# Patient Record
Sex: FEMALE | Race: ASIAN | Employment: UNEMPLOYED | URBAN - METROPOLITAN AREA
[De-identification: names, ages, dates, MRNs, and addresses within clinical notes are randomized per-mention and may not be internally consistent; named-entity substitution may affect disease eponyms.]

---

## 2017-10-17 ENCOUNTER — OFFICE VISIT (OUTPATIENT)
Dept: FAMILY MEDICINE CLINIC | Facility: CLINIC | Age: 32
End: 2017-10-17

## 2017-10-17 VITALS
SYSTOLIC BLOOD PRESSURE: 122 MMHG | DIASTOLIC BLOOD PRESSURE: 62 MMHG | BODY MASS INDEX: 23.97 KG/M2 | HEIGHT: 63.5 IN | HEART RATE: 78 BPM | TEMPERATURE: 98 F | WEIGHT: 137 LBS | OXYGEN SATURATION: 99 % | RESPIRATION RATE: 16 BRPM

## 2017-10-17 DIAGNOSIS — N90.89 SWELLING OF VULVA: ICD-10-CM

## 2017-10-17 DIAGNOSIS — L65.9 HAIR LOSS: ICD-10-CM

## 2017-10-17 DIAGNOSIS — E28.2 PCOS (POLYCYSTIC OVARIAN SYNDROME): ICD-10-CM

## 2017-10-17 DIAGNOSIS — R73.03 PREDIABETES: ICD-10-CM

## 2017-10-17 DIAGNOSIS — Z78.9 VEGETARIAN DIET: ICD-10-CM

## 2017-10-17 DIAGNOSIS — Z00.00 ROUTINE GENERAL MEDICAL EXAMINATION AT A HEALTH CARE FACILITY: Primary | ICD-10-CM

## 2017-10-17 PROCEDURE — 99385 PREV VISIT NEW AGE 18-39: CPT | Performed by: FAMILY MEDICINE

## 2017-10-17 NOTE — PROGRESS NOTES
Yao Carreno is a 33year old female. Patient presents with:  Establish Care: New patient  Imm/Inj: Declined flu vaccine. HPI:   Patient is seen for initial visit.   Seen today for annual physical    Irregular periods, tried OCP in the past but torres symptoms. No symptoms of diabetes. Respiratory: No cough, shortness of breath, dyspnea on exertion, wheezing. Cardiovascular: No heart palpitations, irregular heartbeat, faintness or syncope, no chest pressure or chest pain on exertion.  No edema or varic Art Sher was seen today for establish care and imm/inj. Diagnoses and all orders for this visit:    Routine general medical examination at a health care facility  -     CBC WITH DIFFERENTIAL WITH PLATELET;  Future  -     ASSAY, THYROID STIM HORMONE; Futu

## 2017-10-17 NOTE — PATIENT INSTRUCTIONS
Will call with lab results when available. Please follow-up when you notice a swelling so we can examine it. Please fax any form that needs to be filled out for your insurance company.   Prevention Guidelines, Women Ages 25 to 44  Screening tests and va Tuberculosis Women at increased risk for infection – talk with your healthcare provider Ask your healthcare provider   Vision All women in this age group At least 1 complete exam in your 25s, and 2 in your 35s   Vaccine2 Who needs it How often   Chickenpox Tetanus/diphtheria/pertussis (Td/Tdap) booster All women in this age group Td every 10 years, or a one-time dose of Tdap instead of a Td booster after age 25, then Td every 10 years   Counseling Who needs it How often   BRCA gene mutation testing for breas

## 2017-10-20 ENCOUNTER — LAB ENCOUNTER (OUTPATIENT)
Dept: LAB | Age: 32
End: 2017-10-20
Attending: FAMILY MEDICINE
Payer: COMMERCIAL

## 2017-10-20 DIAGNOSIS — R73.03 PREDIABETES: ICD-10-CM

## 2017-10-20 DIAGNOSIS — Z00.00 ROUTINE GENERAL MEDICAL EXAMINATION AT A HEALTH CARE FACILITY: ICD-10-CM

## 2017-10-20 PROCEDURE — 80050 GENERAL HEALTH PANEL: CPT | Performed by: FAMILY MEDICINE

## 2017-10-20 PROCEDURE — 82607 VITAMIN B-12: CPT | Performed by: FAMILY MEDICINE

## 2017-10-20 PROCEDURE — 83540 ASSAY OF IRON: CPT | Performed by: FAMILY MEDICINE

## 2017-10-20 PROCEDURE — 83036 HEMOGLOBIN GLYCOSYLATED A1C: CPT | Performed by: FAMILY MEDICINE

## 2017-10-20 PROCEDURE — 83550 IRON BINDING TEST: CPT | Performed by: FAMILY MEDICINE

## 2017-10-20 PROCEDURE — 82728 ASSAY OF FERRITIN: CPT | Performed by: FAMILY MEDICINE

## 2017-10-20 PROCEDURE — 36415 COLL VENOUS BLD VENIPUNCTURE: CPT | Performed by: FAMILY MEDICINE

## 2017-10-20 PROCEDURE — 82306 VITAMIN D 25 HYDROXY: CPT | Performed by: FAMILY MEDICINE

## 2017-10-20 PROCEDURE — 80061 LIPID PANEL: CPT | Performed by: FAMILY MEDICINE

## 2017-11-08 ENCOUNTER — TELEPHONE (OUTPATIENT)
Dept: FAMILY MEDICINE CLINIC | Facility: CLINIC | Age: 32
End: 2017-11-08

## 2017-11-08 ENCOUNTER — OFFICE VISIT (OUTPATIENT)
Dept: FAMILY MEDICINE CLINIC | Facility: CLINIC | Age: 32
End: 2017-11-08

## 2017-11-08 VITALS
TEMPERATURE: 98 F | HEART RATE: 88 BPM | SYSTOLIC BLOOD PRESSURE: 112 MMHG | OXYGEN SATURATION: 98 % | HEIGHT: 64 IN | WEIGHT: 140.5 LBS | RESPIRATION RATE: 20 BRPM | BODY MASS INDEX: 23.99 KG/M2 | DIASTOLIC BLOOD PRESSURE: 64 MMHG

## 2017-11-08 DIAGNOSIS — R19.09 LUMP IN THE GROIN: Primary | ICD-10-CM

## 2017-11-08 DIAGNOSIS — J20.9 ACUTE BRONCHITIS, UNSPECIFIED ORGANISM: ICD-10-CM

## 2017-11-08 PROCEDURE — 99213 OFFICE O/P EST LOW 20 MIN: CPT | Performed by: FAMILY MEDICINE

## 2017-11-08 RX ORDER — ALBUTEROL SULFATE 90 UG/1
2 AEROSOL, METERED RESPIRATORY (INHALATION) EVERY 6 HOURS PRN
Qty: 1 INHALER | Refills: 0 | Status: SHIPPED | OUTPATIENT
Start: 2017-11-08 | End: 2017-11-18

## 2017-11-08 NOTE — TELEPHONE ENCOUNTER
Patient states she spoke with Dr Locke Hopping about a condition at her last visit. She states she was advised to call us when the condition occurs and we will put her in that day. I do not see an opening today.   I offered to schedule her for tomorrow and she

## 2017-11-08 NOTE — TELEPHONE ENCOUNTER
Pt called back - had not received a call back yet. Spoke to Dr Darby and scheduled Appt for today. Coming at 3:15, per Dr Darby.     Future Appointments  Date Time Provider Francisco Lisa   11/8/2017 3:30 PM Sharmaine Weaver MD [facility] [address]

## 2017-11-08 NOTE — PATIENT INSTRUCTIONS
will call with ultrasound results when available, likely a hernia, if positive for hernia will refer to a surgeon. What Is Acute Bronchitis? Acute bronchitis is when the airways in your lungs (bronchial tubes) become red and swollen (inflamed).  It is u · A nasal or throat swab. This tests to see if you have a bacterial infection. · A chest X-ray. This is done if your healthcare provider thinks you have pneumonia. · Tests to check for an underlying condition.  Other tests may be done to check for things · Take all of the medicine. Take the medicine until it is used up, even if symptoms have improved. If you don’t, the bronchitis may come back. · Take the medicines as directed. For instance, some medicines should be taken with food.   · Ask about side effe · Breathe in slowly and deeply as you press down on the inhaler to release the medicine. · Inhale fully. Step 4:  · Hold your breath for a count of 10, or as long as you can comfortably. · Then breathe out slowly through your mouth.   · Repeat these step

## 2017-11-13 NOTE — PROGRESS NOTES
Geeta Kaur is a 33year old female. Patient presents with:  Swelling: lower left side of pelvic area. Cough: For four days.     HPI:   Patient complaining of swelling in the left groin and pubic area, states notices it on and off, has had it for th

## 2018-09-05 PROBLEM — S92.352A CLOSED DISPLACED FRACTURE OF FIFTH METATARSAL BONE OF LEFT FOOT, INITIAL ENCOUNTER: Status: ACTIVE | Noted: 2018-09-05

## 2019-02-16 ENCOUNTER — PATIENT MESSAGE (OUTPATIENT)
Dept: FAMILY MEDICINE CLINIC | Facility: CLINIC | Age: 34
End: 2019-02-16

## 2019-02-18 NOTE — TELEPHONE ENCOUNTER
From: Andrew Rasheed  To: Lenora Ferreira MD  Sent: 2/16/2019 11:04 PM CST  Subject: Referral Request    Hello Doctor   In my last visit,you suspected hernia on my lower right side of my abdomen.   Could you please let me know on what kind of test I thiago

## 2019-07-12 ENCOUNTER — OFFICE VISIT (OUTPATIENT)
Dept: FAMILY MEDICINE CLINIC | Facility: CLINIC | Age: 34
End: 2019-07-12
Payer: COMMERCIAL

## 2019-07-12 VITALS
HEIGHT: 63.5 IN | SYSTOLIC BLOOD PRESSURE: 102 MMHG | BODY MASS INDEX: 25.44 KG/M2 | WEIGHT: 145.38 LBS | RESPIRATION RATE: 18 BRPM | HEART RATE: 87 BPM | OXYGEN SATURATION: 99 % | DIASTOLIC BLOOD PRESSURE: 60 MMHG | TEMPERATURE: 99 F

## 2019-07-12 DIAGNOSIS — E55.9 VITAMIN D DEFICIENCY: ICD-10-CM

## 2019-07-12 DIAGNOSIS — R10.2 PELVIC PAIN: Primary | ICD-10-CM

## 2019-07-12 DIAGNOSIS — E53.8 VITAMIN B12 DEFICIENCY: ICD-10-CM

## 2019-07-12 DIAGNOSIS — M25.50 MULTIPLE JOINT PAIN: ICD-10-CM

## 2019-07-12 DIAGNOSIS — M54.2 NECK PAIN: ICD-10-CM

## 2019-07-12 DIAGNOSIS — R39.15 URINARY URGENCY: ICD-10-CM

## 2019-07-12 DIAGNOSIS — R19.09 LUMP IN THE GROIN: ICD-10-CM

## 2019-07-12 DIAGNOSIS — R42 DIZZINESS: ICD-10-CM

## 2019-07-12 LAB
BILIRUBIN: NEGATIVE
GLUCOSE (URINE DIPSTICK): NEGATIVE MG/DL
KETONES (URINE DIPSTICK): NEGATIVE MG/DL
LEUKOCYTES: NEGATIVE
MULTISTIX LOT#: NORMAL NUMERIC
NITRITE, URINE: NEGATIVE
OCCULT BLOOD: NEGATIVE
PH, URINE: 7 (ref 4.5–8)
PROTEIN (URINE DIPSTICK): NEGATIVE MG/DL
SPECIFIC GRAVITY: 1.02 (ref 1–1.03)
URINE-COLOR: YELLOW
UROBILINOGEN,SEMI-QN: 0.2 MG/DL (ref 0–1.9)

## 2019-07-12 PROCEDURE — 99214 OFFICE O/P EST MOD 30 MIN: CPT | Performed by: FAMILY MEDICINE

## 2019-07-12 PROCEDURE — 81003 URINALYSIS AUTO W/O SCOPE: CPT | Performed by: FAMILY MEDICINE

## 2019-07-12 NOTE — PROGRESS NOTES
Keven Nam is a 36year old female. Patient presents with:  Abdominal Pain: lower abdominal pain for three weeks    HPI:   Complaining of left lower abdominal pain that is radiating to the pubic area, states feels a weird sensation.  States a moth ago tenderness in the lower abdomen. GROIN: small palpable lump in the left pubic to the groin area with mild tenderness  EXTREMITIES: no cyanosis, clubbing or edema    ASSESSMENT AND PLAN:   Tabatha Jackson was seen today for abdominal pain.     Diagnoses and all orde

## 2019-07-17 ENCOUNTER — HOSPITAL ENCOUNTER (OUTPATIENT)
Dept: ULTRASOUND IMAGING | Facility: HOSPITAL | Age: 34
Discharge: HOME | End: 2019-07-17
Attending: FAMILY MEDICINE
Payer: COMMERCIAL

## 2019-07-17 ENCOUNTER — LAB ENCOUNTER (OUTPATIENT)
Dept: LAB | Facility: HOSPITAL | Age: 34
End: 2019-07-17
Attending: FAMILY MEDICINE
Payer: COMMERCIAL

## 2019-07-17 DIAGNOSIS — M25.50 MULTIPLE JOINT PAIN: ICD-10-CM

## 2019-07-17 DIAGNOSIS — E53.8 VITAMIN B12 DEFICIENCY: ICD-10-CM

## 2019-07-17 DIAGNOSIS — R10.2 PELVIC PAIN: ICD-10-CM

## 2019-07-17 DIAGNOSIS — R19.09 LUMP IN THE GROIN: ICD-10-CM

## 2019-07-17 DIAGNOSIS — E55.9 VITAMIN D DEFICIENCY: ICD-10-CM

## 2019-07-17 LAB
BASOPHILS # BLD AUTO: 0.02 X10(3) UL (ref 0–0.2)
BASOPHILS NFR BLD AUTO: 0.3 %
CRP SERPL-MCNC: <0.29 MG/DL (ref ?–0.3)
DEPRECATED RDW RBC AUTO: 40.1 FL (ref 35.1–46.3)
EOSINOPHIL # BLD AUTO: 0.19 X10(3) UL (ref 0–0.7)
EOSINOPHIL NFR BLD AUTO: 3.3 %
ERYTHROCYTE [DISTWIDTH] IN BLOOD BY AUTOMATED COUNT: 12.8 % (ref 11–15)
HCT VFR BLD AUTO: 37 % (ref 35–48)
HGB BLD-MCNC: 12.5 G/DL (ref 12–16)
IMM GRANULOCYTES # BLD AUTO: 0.01 X10(3) UL (ref 0–1)
IMM GRANULOCYTES NFR BLD: 0.2 %
LYMPHOCYTES # BLD AUTO: 1.68 X10(3) UL (ref 1–4)
LYMPHOCYTES NFR BLD AUTO: 29 %
MCH RBC QN AUTO: 29.3 PG (ref 26–34)
MCHC RBC AUTO-ENTMCNC: 33.8 G/DL (ref 31–37)
MCV RBC AUTO: 86.7 FL (ref 80–100)
MONOCYTES # BLD AUTO: 0.57 X10(3) UL (ref 0.1–1)
MONOCYTES NFR BLD AUTO: 9.8 %
NEUTROPHILS # BLD AUTO: 3.32 X10 (3) UL (ref 1.5–7.7)
NEUTROPHILS # BLD AUTO: 3.32 X10(3) UL (ref 1.5–7.7)
NEUTROPHILS NFR BLD AUTO: 57.4 %
PLATELET # BLD AUTO: 252 10(3)UL (ref 150–450)
RBC # BLD AUTO: 4.27 X10(6)UL (ref 3.8–5.3)
RHEUMATOID FACT SERPL-ACNC: <10 IU/ML (ref ?–15)
SED RATE-ML: 14 MM/HR (ref 0–25)
VIT B12 SERPL-MCNC: 587 PG/ML (ref 193–986)
VIT D+METAB SERPL-MCNC: 20.2 NG/ML (ref 30–100)
WBC # BLD AUTO: 5.8 X10(3) UL (ref 4–11)

## 2019-07-17 PROCEDURE — 76882 US LMTD JT/FCL EVL NVASC XTR: CPT | Performed by: FAMILY MEDICINE

## 2019-07-17 PROCEDURE — 86431 RHEUMATOID FACTOR QUANT: CPT

## 2019-07-17 PROCEDURE — 36415 COLL VENOUS BLD VENIPUNCTURE: CPT

## 2019-07-17 PROCEDURE — 85652 RBC SED RATE AUTOMATED: CPT

## 2019-07-17 PROCEDURE — 85025 COMPLETE CBC W/AUTO DIFF WBC: CPT

## 2019-07-17 PROCEDURE — 82306 VITAMIN D 25 HYDROXY: CPT

## 2019-07-17 PROCEDURE — 86140 C-REACTIVE PROTEIN: CPT

## 2019-07-17 PROCEDURE — 82607 VITAMIN B-12: CPT

## 2019-07-18 ENCOUNTER — PATIENT MESSAGE (OUTPATIENT)
Dept: FAMILY MEDICINE CLINIC | Facility: CLINIC | Age: 34
End: 2019-07-18

## 2019-07-18 NOTE — TELEPHONE ENCOUNTER
From: Mini Street  To: Doreen Orlando MD  Sent: 7/18/2019 11:47 AM CDT  Subject: Test Results Question    Also i cannot see vitamin d and rheumatoid test results too. .. Are they normal too?

## 2019-07-22 ENCOUNTER — PATIENT MESSAGE (OUTPATIENT)
Dept: FAMILY MEDICINE CLINIC | Facility: CLINIC | Age: 34
End: 2019-07-22

## 2019-07-22 ENCOUNTER — OFFICE VISIT (OUTPATIENT)
Dept: FAMILY MEDICINE CLINIC | Facility: CLINIC | Age: 34
End: 2019-07-22
Payer: COMMERCIAL

## 2019-07-22 ENCOUNTER — HOSPITAL ENCOUNTER (OUTPATIENT)
Dept: GENERAL RADIOLOGY | Facility: HOSPITAL | Age: 34
Discharge: HOME | End: 2019-07-22
Attending: FAMILY MEDICINE
Payer: COMMERCIAL

## 2019-07-22 VITALS
DIASTOLIC BLOOD PRESSURE: 62 MMHG | HEART RATE: 80 BPM | SYSTOLIC BLOOD PRESSURE: 112 MMHG | TEMPERATURE: 99 F | OXYGEN SATURATION: 99 % | RESPIRATION RATE: 16 BRPM | BODY MASS INDEX: 25 KG/M2 | WEIGHT: 145.13 LBS

## 2019-07-22 DIAGNOSIS — M54.50 BACK PAIN OF THORACOLUMBAR REGION: ICD-10-CM

## 2019-07-22 DIAGNOSIS — M54.6 BACK PAIN OF THORACOLUMBAR REGION: ICD-10-CM

## 2019-07-22 DIAGNOSIS — M54.6 BACK PAIN OF THORACOLUMBAR REGION: Primary | ICD-10-CM

## 2019-07-22 DIAGNOSIS — M62.830 SPASM OF MUSCLE, BACK: ICD-10-CM

## 2019-07-22 DIAGNOSIS — M54.50 BACK PAIN OF THORACOLUMBAR REGION: Primary | ICD-10-CM

## 2019-07-22 PROCEDURE — 72072 X-RAY EXAM THORAC SPINE 3VWS: CPT | Performed by: FAMILY MEDICINE

## 2019-07-22 PROCEDURE — 99213 OFFICE O/P EST LOW 20 MIN: CPT | Performed by: FAMILY MEDICINE

## 2019-07-22 PROCEDURE — 72110 X-RAY EXAM L-2 SPINE 4/>VWS: CPT | Performed by: FAMILY MEDICINE

## 2019-07-22 NOTE — TELEPHONE ENCOUNTER
From: Faiza Lester  To: Jason Urbina MD  Sent: 7/22/2019 9:24 AM CDT  Subject: Other    Goodmorning Doctor,  I am having a continuous back pain . Could you please let me know what could be the reason and what i can do. ..

## 2019-07-22 NOTE — PROGRESS NOTES
Kary Habermann is a 36year old female. Patient presents with:  Back Pain: Pt here for f/u of lower back pain that is getting worse.     HPI:   Patient complaining of mid back and lower back pain for the past 1 month, states has a hard time sitting for

## 2019-07-22 NOTE — PATIENT INSTRUCTIONS
Please take ibuprofen 200 mg 3 tablets at night for pain. Relieving Back Pain  Back pain is a common problem. You can strain back muscles by lifting too much weight or just by moving the wrong way. Back strain can be uncomfortable, even painful.  And it

## 2019-07-23 NOTE — TELEPHONE ENCOUNTER
Pt called asking to speak to nurse about her Xray results. Has additional questions.   (Transferred to Triage voicemail to leave a detailed message)

## 2019-07-23 NOTE — TELEPHONE ENCOUNTER
Per Dr Radha Ricardo, pt was advised if she has questions regarding her results she should schedule an appt to discuss.

## 2019-07-23 NOTE — TELEPHONE ENCOUNTER
From: Mini Street  To: Doreen Orlando MD  Sent: 7/22/2019 5:58 PM CDT  Subject: Test Results Question    Hi Doctor,  Had my X-Ray done in the afternoon. .  When can I expect my result?   Thanks

## 2019-07-23 NOTE — TELEPHONE ENCOUNTER
Called and spoke to patient. Questions answered regarding lumbar spine x-ray. State she was told it was normal then saw the report that mentioned \"spondylolysis\".  Explained this is not an uncommon finding on low back x-rays and is often considered an \

## 2019-07-23 NOTE — TELEPHONE ENCOUNTER
Pt called to schedule appointment as per Clinical Mgr MyChart message. Does not want to schedule without speaking to nurse. Oneal Claude

## 2019-07-24 ENCOUNTER — TELEPHONE (OUTPATIENT)
Dept: PHYSICAL THERAPY | Facility: HOSPITAL | Age: 34
End: 2019-07-24

## 2019-07-25 ENCOUNTER — APPOINTMENT (OUTPATIENT)
Dept: PHYSICAL THERAPY | Facility: HOSPITAL | Age: 34
End: 2019-07-25
Payer: COMMERCIAL

## 2019-07-26 ENCOUNTER — HOSPITAL ENCOUNTER (OUTPATIENT)
Dept: PHYSICAL THERAPY | Facility: HOSPITAL | Age: 34
Setting detail: THERAPIES SERIES
Discharge: HOME | End: 2019-07-26
Attending: FAMILY MEDICINE
Payer: COMMERCIAL

## 2019-07-26 PROCEDURE — 97162 PT EVAL MOD COMPLEX 30 MIN: CPT | Performed by: PHYSICAL THERAPIST

## 2019-07-26 PROCEDURE — 97110 THERAPEUTIC EXERCISES: CPT | Performed by: PHYSICAL THERAPIST

## 2019-07-26 NOTE — PROGRESS NOTES
SPINE EVALUATION:   Referring Physician: Dr. Kirsten Kirk   Diagnosis: Back pain of thoracolumbar region (M54.5,M54.6)  Spasm of muscle, back (G70.468)     Date of Service: 7/26/2019     PATIENT SUMMARY   Birgit Cain is a 36year old female who presents Laine Joseph describes prior level of function likes to walk for exercise, performs lunges and squats post exercises. Used to use 10# wghts for UE exercises but has stopped due to pain . Catrina Winter  Pt goals include \"to be on my feet, active, mainly pain free fro 1. Bilateral L5 spondylolysis without evidence of anterolisthesis. 2. No additional findings.       Cervical  AROM: (* denotes performed with pain)  Flexion: chin to chest   Extension: nl   Sidebending: R 45; L 55  Rotation: R 62; L 70     Lumbar  AROM science education  and importance of remaining active  Also instructed in sleeping postures, and using a towel to help support head and neck while sleeping .    Patient was instructed in and issued a HEP for: see patient instruction     Charges: PT Eval Mod certification required: Yes  I certify the need for these services furnished under this plan of treatment and while under my care.     X___________________________________________________ Date____________________    Certification From: 3/02/3988  To:10/24/2

## 2019-07-29 ENCOUNTER — HOSPITAL ENCOUNTER (OUTPATIENT)
Dept: PHYSICAL THERAPY | Facility: HOSPITAL | Age: 34
Setting detail: THERAPIES SERIES
Discharge: HOME | End: 2019-07-29
Attending: FAMILY MEDICINE
Payer: COMMERCIAL

## 2019-07-29 PROCEDURE — 97110 THERAPEUTIC EXERCISES: CPT

## 2019-07-29 PROCEDURE — 97140 MANUAL THERAPY 1/> REGIONS: CPT

## 2019-07-30 NOTE — PROGRESS NOTES
Diagnosis: Back pain of thoracolumbar region (M54.5,M54.6)  Spasm of muscle, back (T07.236)           Authorized # of Visits: -     Next MD visit: none scheduled  Fall Risk: standard         Precautions: n/a             Subjective:pain persists.  Is trying

## 2019-08-02 ENCOUNTER — HOSPITAL ENCOUNTER (OUTPATIENT)
Dept: PHYSICAL THERAPY | Facility: HOSPITAL | Age: 34
Setting detail: THERAPIES SERIES
Discharge: HOME | End: 2019-08-02
Attending: FAMILY MEDICINE
Payer: COMMERCIAL

## 2019-08-02 PROCEDURE — 97140 MANUAL THERAPY 1/> REGIONS: CPT | Performed by: PHYSICAL THERAPIST

## 2019-08-02 PROCEDURE — 97110 THERAPEUTIC EXERCISES: CPT | Performed by: PHYSICAL THERAPIST

## 2019-08-02 NOTE — PROGRESS NOTES
Diagnosis: Back pain of thoracolumbar region (M54.5,M54.6)  Spasm of muscle, back (O68.651)           Authorized # of Visits: 8    Next MD visit: none scheduled  Fall Risk: standard         Precautions: n/a             Subjective:Neck pain isn't as bad.   I

## 2019-08-07 ENCOUNTER — OFFICE VISIT (OUTPATIENT)
Dept: SURGERY | Facility: CLINIC | Age: 34
End: 2019-08-07
Payer: COMMERCIAL

## 2019-08-07 VITALS
HEART RATE: 79 BPM | WEIGHT: 145 LBS | BODY MASS INDEX: 25 KG/M2 | SYSTOLIC BLOOD PRESSURE: 111 MMHG | TEMPERATURE: 98 F | DIASTOLIC BLOOD PRESSURE: 80 MMHG

## 2019-08-07 DIAGNOSIS — K40.90 LEFT INGUINAL HERNIA: Primary | ICD-10-CM

## 2019-08-07 PROCEDURE — 99244 OFF/OP CNSLTJ NEW/EST MOD 40: CPT | Performed by: SURGERY

## 2019-08-07 NOTE — H&P
New Patient Visit Note       Active Problems      1.  Left inguinal hernia        Chief Complaint   Patient presents with:  Hernia: left inguinal hernia consult      History of Present Illness   The patient is a 41-year-old female seen at the request of her 2      Review of Systems  The Review of Systems has been reviewed by me during today. Review of Systems   Constitutional: Negative for chills, diaphoresis, fatigue, fever and unexpected weight change.    HENT: Negative for hearing loss, nosebleeds, sore th tenderness, no tenderness at McBurney's point and negative Mendez's sign. A hernia is present. Hernia confirmed positive in the left inguinal area (reducible). Hernia confirmed negative in the ventral area and confirmed negative in the right inguinal area.

## 2019-08-09 DIAGNOSIS — E55.9 VITAMIN D DEFICIENCY: ICD-10-CM

## 2019-08-12 RX ORDER — ERGOCALCIFEROL 1.25 MG/1
50000 CAPSULE ORAL WEEKLY
Qty: 4 CAPSULE | Refills: 2 | OUTPATIENT
Start: 2019-08-12 | End: 2019-09-11

## 2019-08-12 NOTE — TELEPHONE ENCOUNTER
LOV    LAST LAB    7/17/19  1:56 PM    25-Hydroxyvitamin D (Total) 30.0 - 100.0 ng/mL 20.2Low           LAST RX   ergocalciferol 32284 units Oral Cap 4 capsule 2 7/18/2019         Next OV Visit date not found      PROTOCOL  Please advise. No protocol.  If f

## 2019-10-07 DIAGNOSIS — E55.9 VITAMIN D DEFICIENCY: ICD-10-CM

## 2019-10-07 RX ORDER — ERGOCALCIFEROL 1.25 MG/1
CAPSULE ORAL
Qty: 4 CAPSULE | Refills: 2 | OUTPATIENT
Start: 2019-10-07

## 2020-03-21 ENCOUNTER — E-VISIT (OUTPATIENT)
Dept: FAMILY MEDICINE CLINIC | Facility: CLINIC | Age: 35
End: 2020-03-21

## 2020-03-21 DIAGNOSIS — R39.9 UTI SYMPTOMS: Primary | ICD-10-CM

## 2020-03-21 RX ORDER — NITROFURANTOIN 25; 75 MG/1; MG/1
100 CAPSULE ORAL 2 TIMES DAILY
Qty: 14 CAPSULE | Refills: 0 | Status: SHIPPED | OUTPATIENT
Start: 2020-03-21 | End: 2020-03-28

## 2020-04-29 ENCOUNTER — TELEMEDICINE (OUTPATIENT)
Dept: FAMILY MEDICINE CLINIC | Facility: CLINIC | Age: 35
End: 2020-04-29

## 2020-04-29 VITALS — SYSTOLIC BLOOD PRESSURE: 100 MMHG | BODY MASS INDEX: 26 KG/M2 | WEIGHT: 150 LBS | DIASTOLIC BLOOD PRESSURE: 90 MMHG

## 2020-04-29 DIAGNOSIS — E53.8 VITAMIN B12 DEFICIENCY: ICD-10-CM

## 2020-04-29 DIAGNOSIS — R63.5 WEIGHT GAIN: ICD-10-CM

## 2020-04-29 DIAGNOSIS — E55.9 VITAMIN D DEFICIENCY: ICD-10-CM

## 2020-04-29 DIAGNOSIS — R42 VERTIGO: ICD-10-CM

## 2020-04-29 DIAGNOSIS — E28.2 PCOS (POLYCYSTIC OVARIAN SYNDROME): Primary | ICD-10-CM

## 2020-04-29 DIAGNOSIS — M25.50 MULTIPLE JOINT PAIN: ICD-10-CM

## 2020-04-29 DIAGNOSIS — B07.0 PLANTAR WART OF LEFT FOOT: ICD-10-CM

## 2020-04-29 PROBLEM — S92.352A CLOSED DISPLACED FRACTURE OF FIFTH METATARSAL BONE OF LEFT FOOT, INITIAL ENCOUNTER: Status: RESOLVED | Noted: 2018-09-05 | Resolved: 2020-04-29

## 2020-04-29 PROCEDURE — 99214 OFFICE O/P EST MOD 30 MIN: CPT | Performed by: FAMILY MEDICINE

## 2020-04-29 RX ORDER — CHOLECALCIFEROL (VITAMIN D3) 25 MCG
TABLET,CHEWABLE ORAL
COMMUNITY

## 2020-04-29 RX ORDER — BIOTIN 1 MG
1 TABLET ORAL DAILY
COMMUNITY

## 2020-04-29 NOTE — PATIENT INSTRUCTIONS
Please get labs done when you can. Please schedule appointment with podiatrist.    Continue to monitor your vertigo symptoms, if worsening please call and we can schedule an in office visit for further evaluation.

## 2020-04-29 NOTE — PROGRESS NOTES
Regla Young is a 30year old female. Patient presents with:  Medication Follow-Up    HPI:   Regla Young is a 30year old female seen via vide visit.     Patient complaining of multiple joint pains, both knees, left more than right, states somet as per HPI  RESPIRATORY: denies shortness of breath with exertion  CARDIOVASCULAR: denies chest pain on exertion  GI: denies abdominal pain and denies heartburn  MUSCULOSKELETAL: as per HPI  NEURO: denies headaches    EXAM:   /90   Wt 150 lb (68 kg) visit takes into account review of history, labs, medications, radiology tests , consultations and/or decision making. Appropriate medical decision-making and tests are ordered as detailed in the assessment and plan of care.

## 2021-05-12 NOTE — PROGRESS NOTES
Ann Bae is a 29year old female.   Patient presents with:  Headache: heavy feeling at the top of head for 3 weeks    HPI:   Ann Bae is a 29year old female complaining that she has been feeling some heaviness in her head, states has noti intolerance to light and sound when she gets the headache. Usually takes Advil and that seems to help. Denies any aura before the headache starts, denies any nausea, vomiting tingling or numbness in her hands or legs.     Complaining of pain in the neck a EXTERNAL  Discussed treatment options including counseling and medications, patient states would like to try counseling first.    Migraine without aura and without status migrainosus, not intractable  Advised to keep a headache diary, if symptoms are frequ

## 2021-05-12 NOTE — PATIENT INSTRUCTIONS
Counseling for Depression  For some people, counseling can work as well as medicine for mild to moderate depression. Counseling is also called talk therapy.  When done by a trained professional, this treatment is a powerful way to better understand your t or community group  · A 12-step program such as Alcoholics Anonymous for dealing with problems that can contribute to depression, such as alcohol or drug addiction  Shyanne last reviewed this educational content on 9/1/2019  © 8127-2910 [employer] Recovering from depression is a process. Don’t be discouraged if it takes some time to feel better. · Keep it simple. Depression saps your energy and concentration. So you won’t be able to do all the things you used to do.  Set small goals and do what you control your triggers to avoid or reduce the severity of your migraines. Know your triggers  Be aware that you may have more than one trigger, and that some triggers may work together. Common migraine triggers include:   · Food and nutrition.  Skipping me Shyanne last reviewed this educational content on 5/1/2018  © 9411-9858 [employer]. All rights reserved. This information is not intended as a substitute for professional medical care.  Always follow your healthcare professional's instructio then uses their hands and elbows to realign your spine. Physical therapy  is done by a specialist trained to treat injuries and musculoskeletal disorders.  Your physical therapist (PT) will teach you how to strengthen muscles, improve the spine’s alignmen

## 2021-05-23 PROBLEM — G44.209 TENSION HEADACHE: Status: ACTIVE | Noted: 2021-05-23

## 2021-05-23 PROBLEM — G43.009 MIGRAINE WITHOUT AURA AND WITHOUT STATUS MIGRAINOSUS, NOT INTRACTABLE: Status: ACTIVE | Noted: 2021-05-23

## 2021-05-23 PROBLEM — F32.2 CURRENT SEVERE EPISODE OF MAJOR DEPRESSIVE DISORDER WITHOUT PSYCHOTIC FEATURES WITHOUT PRIOR EPISODE (HCC): Status: ACTIVE | Noted: 2021-05-23

## 2021-05-23 PROBLEM — M54.2 NECK PAIN: Status: ACTIVE | Noted: 2021-05-23

## 2021-11-10 ENCOUNTER — TELEPHONE (OUTPATIENT)
Dept: FAMILY MEDICINE CLINIC | Facility: CLINIC | Age: 36
End: 2021-11-10

## 2021-11-10 NOTE — TELEPHONE ENCOUNTER
Spoke to patient who states for the past two days has had pain on the top of her head and a heavy feeling in her left arm. States feels like a cramp with tingling in her fingers. HA radiates to neck and left shoulder. Denies CP/tightness or SOB.  States

## 2022-03-15 ENCOUNTER — TELEPHONE (OUTPATIENT)
Dept: FAMILY MEDICINE CLINIC | Facility: CLINIC | Age: 37
End: 2022-03-15

## 2022-03-31 NOTE — TELEPHONE ENCOUNTER
[phone]   Lm for pt ([id number] Audra Purcell per HIPAA) to inform f/u as noted pt missed 3/18/22 OV and re-scheduled to 5/2. F/u on condition update- inquiring if any new or worsening of sx? Advised if sx worsen or new sx- SOB, HA, dizziness, N/V, bowel issues, to go to UC/ ER. To call back at the office to further discuss.

## 2022-04-20 ENCOUNTER — OFFICE VISIT (OUTPATIENT)
Dept: FAMILY MEDICINE CLINIC | Facility: CLINIC | Age: 37
End: 2022-04-20
Payer: COMMERCIAL

## 2022-04-20 ENCOUNTER — MED REC SCAN ONLY (OUTPATIENT)
Dept: FAMILY MEDICINE CLINIC | Facility: CLINIC | Age: 37
End: 2022-04-20

## 2022-04-20 VITALS
HEART RATE: 80 BPM | BODY MASS INDEX: 25.72 KG/M2 | DIASTOLIC BLOOD PRESSURE: 80 MMHG | SYSTOLIC BLOOD PRESSURE: 102 MMHG | TEMPERATURE: 97 F | RESPIRATION RATE: 18 BRPM | OXYGEN SATURATION: 99 % | HEIGHT: 63.5 IN | WEIGHT: 147 LBS

## 2022-04-20 DIAGNOSIS — M94.0 COSTOCHONDRITIS: Primary | ICD-10-CM

## 2022-04-20 DIAGNOSIS — E55.9 VITAMIN D DEFICIENCY: ICD-10-CM

## 2022-04-20 DIAGNOSIS — M85.80 OSTEOPENIA, UNSPECIFIED LOCATION: ICD-10-CM

## 2022-04-20 DIAGNOSIS — L70.9 ACNE, UNSPECIFIED ACNE TYPE: ICD-10-CM

## 2022-04-20 DIAGNOSIS — L84 CALLUS OF FOOT: ICD-10-CM

## 2022-04-20 PROCEDURE — 3079F DIAST BP 80-89 MM HG: CPT | Performed by: FAMILY MEDICINE

## 2022-04-20 PROCEDURE — 3074F SYST BP LT 130 MM HG: CPT | Performed by: FAMILY MEDICINE

## 2022-04-20 PROCEDURE — 99214 OFFICE O/P EST MOD 30 MIN: CPT | Performed by: FAMILY MEDICINE

## 2022-04-20 PROCEDURE — 3008F BODY MASS INDEX DOCD: CPT | Performed by: FAMILY MEDICINE

## 2022-04-20 RX ORDER — ERGOCALCIFEROL 1.25 MG/1
50000 CAPSULE ORAL WEEKLY
Qty: 12 CAPSULE | Refills: 0 | Status: SHIPPED | OUTPATIENT
Start: 2022-04-20 | End: 2022-07-19

## 2022-11-28 ENCOUNTER — TELEPHONE (OUTPATIENT)
Dept: FAMILY MEDICINE CLINIC | Facility: CLINIC | Age: 37
End: 2022-11-28

## 2022-11-28 NOTE — TELEPHONE ENCOUNTER
Called patient - name and  verified. She is reporting having lower back pain that is getting worse. It stated 5 days ago, denies fall or caring something heavy. She states having difficulty turning and seating. It is constant pain as if weight is put on her back. Reports pain 8/10. She was offered to try home symptoms management until her appointment tomorrow with Dr. Sheila Steen or go to immediate care. Informed to try heat/cold pack and ibuprofen to help with back pain. Sandoval Rao immediate care information provided. She verbalized understanding. No further action needed.

## 2022-11-28 NOTE — TELEPHONE ENCOUNTER
Patient contacted on call provider about back pain,  Provider in clinic and unable to triage, Please assist with triage and either schedule for Acute or refer to IC as appropriate.   Thanks

## 2022-11-29 ENCOUNTER — OFFICE VISIT (OUTPATIENT)
Dept: FAMILY MEDICINE CLINIC | Facility: CLINIC | Age: 37
End: 2022-11-29
Payer: COMMERCIAL

## 2022-11-29 VITALS
RESPIRATION RATE: 18 BRPM | DIASTOLIC BLOOD PRESSURE: 78 MMHG | TEMPERATURE: 98 F | SYSTOLIC BLOOD PRESSURE: 102 MMHG | HEIGHT: 63.5 IN | OXYGEN SATURATION: 98 % | HEART RATE: 75 BPM | WEIGHT: 151 LBS | BODY MASS INDEX: 26.42 KG/M2

## 2022-11-29 DIAGNOSIS — M54.50 ACUTE BILATERAL LOW BACK PAIN WITHOUT SCIATICA: Primary | ICD-10-CM

## 2022-11-29 DIAGNOSIS — M62.838 SPASM OF MUSCLE: ICD-10-CM

## 2022-11-29 DIAGNOSIS — M54.2 NECK PAIN: ICD-10-CM

## 2022-11-29 PROCEDURE — 99213 OFFICE O/P EST LOW 20 MIN: CPT | Performed by: FAMILY MEDICINE

## 2022-11-29 PROCEDURE — 3078F DIAST BP <80 MM HG: CPT | Performed by: FAMILY MEDICINE

## 2022-11-29 PROCEDURE — 3074F SYST BP LT 130 MM HG: CPT | Performed by: FAMILY MEDICINE

## 2022-11-29 PROCEDURE — 3008F BODY MASS INDEX DOCD: CPT | Performed by: FAMILY MEDICINE

## 2022-11-29 RX ORDER — TIZANIDINE 4 MG/1
4 TABLET ORAL NIGHTLY
Qty: 15 TABLET | Refills: 0 | Status: SHIPPED | OUTPATIENT
Start: 2022-11-29 | End: 2022-12-14

## 2022-11-29 RX ORDER — PREDNISONE 10 MG/1
TABLET ORAL
Qty: 15 TABLET | Refills: 0 | Status: SHIPPED | OUTPATIENT
Start: 2022-11-29 | End: 2022-12-04

## 2023-01-17 ENCOUNTER — TELEPHONE (OUTPATIENT)
Dept: FAMILY MEDICINE CLINIC | Facility: CLINIC | Age: 38
End: 2023-01-17

## 2023-01-23 NOTE — TELEPHONE ENCOUNTER
Called patient who states she is still having the pain in her chest that she saw Dr. Nicole Cornejo for in April. It has not gotten any better. Offered sooner appt to address pain follow up but she should still keep PE appt in March. Appt scheduled.     Future Appointments   Date Time Provider Francisco Gonzalez   1/24/2023  9:20 AM Arlette Bejarano MD [facility]   3/14/2023  3:00 PM Arlette Bejarano MD [facility]

## 2023-01-24 ENCOUNTER — OFFICE VISIT (OUTPATIENT)
Dept: FAMILY MEDICINE CLINIC | Facility: CLINIC | Age: 38
End: 2023-01-24
Payer: COMMERCIAL

## 2023-01-24 ENCOUNTER — LAB ENCOUNTER (OUTPATIENT)
Dept: LAB | Age: 38
End: 2023-01-24
Attending: FAMILY MEDICINE
Payer: COMMERCIAL

## 2023-01-24 VITALS
SYSTOLIC BLOOD PRESSURE: 110 MMHG | HEIGHT: 63.5 IN | BODY MASS INDEX: 26.95 KG/M2 | HEART RATE: 94 BPM | DIASTOLIC BLOOD PRESSURE: 80 MMHG | RESPIRATION RATE: 18 BRPM | TEMPERATURE: 98 F | OXYGEN SATURATION: 98 % | WEIGHT: 154 LBS

## 2023-01-24 DIAGNOSIS — E61.1 IRON DEFICIENCY: ICD-10-CM

## 2023-01-24 DIAGNOSIS — E55.9 VITAMIN D DEFICIENCY: ICD-10-CM

## 2023-01-24 DIAGNOSIS — H69.81 DYSFUNCTION OF RIGHT EUSTACHIAN TUBE: ICD-10-CM

## 2023-01-24 DIAGNOSIS — E53.8 VITAMIN B12 DEFICIENCY: ICD-10-CM

## 2023-01-24 DIAGNOSIS — M94.0 COSTOCHONDRITIS: Primary | ICD-10-CM

## 2023-01-24 DIAGNOSIS — Z00.00 LABORATORY EXAM ORDERED AS PART OF ROUTINE GENERAL MEDICAL EXAMINATION: ICD-10-CM

## 2023-01-24 DIAGNOSIS — M54.6 ACUTE MIDLINE THORACIC BACK PAIN: ICD-10-CM

## 2023-01-24 LAB
ALBUMIN SERPL-MCNC: 3.6 G/DL (ref 3.4–5)
ALBUMIN/GLOB SERPL: 0.9 {RATIO} (ref 1–2)
ALP LIVER SERPL-CCNC: 49 U/L
ALT SERPL-CCNC: 18 U/L
ANION GAP SERPL CALC-SCNC: 4 MMOL/L (ref 0–18)
AST SERPL-CCNC: 18 U/L (ref 15–37)
BASOPHILS # BLD AUTO: 0.03 X10(3) UL (ref 0–0.2)
BASOPHILS NFR BLD AUTO: 0.6 %
BILIRUB SERPL-MCNC: 0.3 MG/DL (ref 0.1–2)
BUN BLD-MCNC: 9 MG/DL (ref 7–18)
CALCIUM BLD-MCNC: 8.9 MG/DL (ref 8.5–10.1)
CHLORIDE SERPL-SCNC: 108 MMOL/L (ref 98–112)
CHOLEST SERPL-MCNC: 152 MG/DL (ref ?–200)
CO2 SERPL-SCNC: 24 MMOL/L (ref 21–32)
CREAT BLD-MCNC: 0.5 MG/DL
DEPRECATED HBV CORE AB SER IA-ACNC: 19.4 NG/ML
EOSINOPHIL # BLD AUTO: 0.08 X10(3) UL (ref 0–0.7)
EOSINOPHIL NFR BLD AUTO: 1.5 %
ERYTHROCYTE [DISTWIDTH] IN BLOOD BY AUTOMATED COUNT: 12.7 %
FASTING PATIENT LIPID ANSWER: NO
FASTING STATUS PATIENT QL REPORTED: NO
GFR SERPLBLD BASED ON 1.73 SQ M-ARVRAT: 125 ML/MIN/1.73M2 (ref 60–?)
GLOBULIN PLAS-MCNC: 3.9 G/DL (ref 2.8–4.4)
GLUCOSE BLD-MCNC: 93 MG/DL (ref 70–99)
HCT VFR BLD AUTO: 37.5 %
HDLC SERPL-MCNC: 61 MG/DL (ref 40–59)
HGB BLD-MCNC: 12.5 G/DL
IMM GRANULOCYTES # BLD AUTO: 0.01 X10(3) UL (ref 0–1)
IMM GRANULOCYTES NFR BLD: 0.2 %
IRON SATN MFR SERPL: 13 %
IRON SERPL-MCNC: 59 UG/DL
LDLC SERPL CALC-MCNC: 79 MG/DL (ref ?–100)
LYMPHOCYTES # BLD AUTO: 1.41 X10(3) UL (ref 1–4)
LYMPHOCYTES NFR BLD AUTO: 26.7 %
MCH RBC QN AUTO: 29.6 PG (ref 26–34)
MCHC RBC AUTO-ENTMCNC: 33.3 G/DL (ref 31–37)
MCV RBC AUTO: 88.9 FL
MONOCYTES # BLD AUTO: 0.51 X10(3) UL (ref 0.1–1)
MONOCYTES NFR BLD AUTO: 9.6 %
NEUTROPHILS # BLD AUTO: 3.25 X10 (3) UL (ref 1.5–7.7)
NEUTROPHILS # BLD AUTO: 3.25 X10(3) UL (ref 1.5–7.7)
NEUTROPHILS NFR BLD AUTO: 61.4 %
NONHDLC SERPL-MCNC: 91 MG/DL (ref ?–130)
OSMOLALITY SERPL CALC.SUM OF ELEC: 280 MOSM/KG (ref 275–295)
PLATELET # BLD AUTO: 250 10(3)UL (ref 150–450)
POTASSIUM SERPL-SCNC: 4.7 MMOL/L (ref 3.5–5.1)
PROT SERPL-MCNC: 7.5 G/DL (ref 6.4–8.2)
RBC # BLD AUTO: 4.22 X10(6)UL
SODIUM SERPL-SCNC: 136 MMOL/L (ref 136–145)
TIBC SERPL-MCNC: 444 UG/DL (ref 240–450)
TRANSFERRIN SERPL-MCNC: 298 MG/DL (ref 200–360)
TRIGL SERPL-MCNC: 58 MG/DL (ref 30–149)
TSI SER-ACNC: 2.08 MIU/ML (ref 0.36–3.74)
VIT B12 SERPL-MCNC: 448 PG/ML (ref 193–986)
VIT D+METAB SERPL-MCNC: 9.7 NG/ML (ref 30–100)
VLDLC SERPL CALC-MCNC: 9 MG/DL (ref 0–30)
WBC # BLD AUTO: 5.3 X10(3) UL (ref 4–11)

## 2023-01-24 PROCEDURE — 3008F BODY MASS INDEX DOCD: CPT | Performed by: FAMILY MEDICINE

## 2023-01-24 PROCEDURE — 80061 LIPID PANEL: CPT | Performed by: FAMILY MEDICINE

## 2023-01-24 PROCEDURE — 3074F SYST BP LT 130 MM HG: CPT | Performed by: FAMILY MEDICINE

## 2023-01-24 PROCEDURE — 83540 ASSAY OF IRON: CPT | Performed by: FAMILY MEDICINE

## 2023-01-24 PROCEDURE — 82728 ASSAY OF FERRITIN: CPT | Performed by: FAMILY MEDICINE

## 2023-01-24 PROCEDURE — 99213 OFFICE O/P EST LOW 20 MIN: CPT | Performed by: FAMILY MEDICINE

## 2023-01-24 PROCEDURE — 80050 GENERAL HEALTH PANEL: CPT | Performed by: FAMILY MEDICINE

## 2023-01-24 PROCEDURE — 3079F DIAST BP 80-89 MM HG: CPT | Performed by: FAMILY MEDICINE

## 2023-01-24 PROCEDURE — 83550 IRON BINDING TEST: CPT | Performed by: FAMILY MEDICINE

## 2023-01-24 PROCEDURE — 82306 VITAMIN D 25 HYDROXY: CPT | Performed by: FAMILY MEDICINE

## 2023-01-24 PROCEDURE — 82607 VITAMIN B-12: CPT | Performed by: FAMILY MEDICINE

## 2023-01-24 NOTE — PATIENT INSTRUCTIONS
Please apply topical Aspercreme with Lidocaine for the pain in the chest.    Try over the counter flonase for the ear discomfort which is likely eustachain tube dysfunction. If pain is persistent will refer to ENT.

## 2023-03-14 ENCOUNTER — OFFICE VISIT (OUTPATIENT)
Dept: FAMILY MEDICINE CLINIC | Facility: CLINIC | Age: 38
End: 2023-03-14
Payer: COMMERCIAL

## 2023-03-14 VITALS
HEART RATE: 82 BPM | RESPIRATION RATE: 18 BRPM | WEIGHT: 154 LBS | DIASTOLIC BLOOD PRESSURE: 80 MMHG | BODY MASS INDEX: 26.95 KG/M2 | TEMPERATURE: 98 F | OXYGEN SATURATION: 98 % | SYSTOLIC BLOOD PRESSURE: 102 MMHG | HEIGHT: 63.5 IN

## 2023-03-14 DIAGNOSIS — E55.9 VITAMIN D DEFICIENCY: ICD-10-CM

## 2023-03-14 DIAGNOSIS — Z23 NEED FOR VACCINATION: ICD-10-CM

## 2023-03-14 DIAGNOSIS — Z00.00 ROUTINE GENERAL MEDICAL EXAMINATION AT A HEALTH CARE FACILITY: Primary | ICD-10-CM

## 2023-03-14 PROCEDURE — 3079F DIAST BP 80-89 MM HG: CPT | Performed by: FAMILY MEDICINE

## 2023-03-14 PROCEDURE — 99395 PREV VISIT EST AGE 18-39: CPT | Performed by: FAMILY MEDICINE

## 2023-03-14 PROCEDURE — 3008F BODY MASS INDEX DOCD: CPT | Performed by: FAMILY MEDICINE

## 2023-03-14 PROCEDURE — 3074F SYST BP LT 130 MM HG: CPT | Performed by: FAMILY MEDICINE

## 2023-04-17 ENCOUNTER — LAB ENCOUNTER (OUTPATIENT)
Dept: LAB | Age: 38
End: 2023-04-17
Attending: FAMILY MEDICINE
Payer: COMMERCIAL

## 2023-04-17 ENCOUNTER — TELEPHONE (OUTPATIENT)
Dept: FAMILY MEDICINE CLINIC | Facility: CLINIC | Age: 38
End: 2023-04-17

## 2023-04-17 DIAGNOSIS — E53.8 VITAMIN B12 DEFICIENCY: ICD-10-CM

## 2023-04-17 DIAGNOSIS — E55.9 VITAMIN D DEFICIENCY: ICD-10-CM

## 2023-04-17 LAB
VIT B12 SERPL-MCNC: 595 PG/ML (ref 193–986)
VIT D+METAB SERPL-MCNC: 40 NG/ML (ref 30–100)

## 2023-04-17 PROCEDURE — 82607 VITAMIN B-12: CPT | Performed by: FAMILY MEDICINE

## 2023-04-17 PROCEDURE — 82306 VITAMIN D 25 HYDROXY: CPT | Performed by: FAMILY MEDICINE

## 2023-04-17 NOTE — TELEPHONE ENCOUNTER
Pt walked in asking to speak to Dr.     She went for an Eye exam today. As told to get an MRI of the Brain. Wants to have a conversation with the nurse/ Dr about this.

## 2023-04-17 NOTE — TELEPHONE ENCOUNTER
Called patient who states she was at [facility] today because she was having shades and shadows in her field of vision. Has been taking eye drops for elevated pressure in her left eye at bedtime and using drops for dry eyes in both eyes in the morning. States the ophthalmologist told her that her eyes looked OK. He ordered an MRI to see if it's something in her brain. Advised Dr. Yovani Lion would needs to see the Ophthalmologist's notes before she can give any further explanation or answer questions.     Dr. Naresh Llamas    Future Appointments   Date Time Provider Francisco Gonzalez   4/25/2023  3:15 PM PF MRI RM1 (1.5T)  MRI [place]

## 2023-04-18 NOTE — TELEPHONE ENCOUNTER
Please have her schedule an appointment if she would like to discuss her MRI results, will review Ophthalmology notes when available, please call [facility] to get consult notes.

## 2023-05-03 ENCOUNTER — OFFICE VISIT (OUTPATIENT)
Dept: FAMILY MEDICINE CLINIC | Facility: CLINIC | Age: 38
End: 2023-05-03
Payer: COMMERCIAL

## 2023-05-03 VITALS
SYSTOLIC BLOOD PRESSURE: 122 MMHG | DIASTOLIC BLOOD PRESSURE: 60 MMHG | WEIGHT: 156 LBS | OXYGEN SATURATION: 99 % | RESPIRATION RATE: 16 BRPM | HEIGHT: 63.5 IN | BODY MASS INDEX: 27.3 KG/M2 | HEART RATE: 88 BPM | TEMPERATURE: 98 F

## 2023-05-03 DIAGNOSIS — R51.9 ACUTE INTRACTABLE HEADACHE, UNSPECIFIED HEADACHE TYPE: ICD-10-CM

## 2023-05-03 DIAGNOSIS — R25.3 MUSCLE TWITCHING: Primary | ICD-10-CM

## 2023-05-03 DIAGNOSIS — H35.711 CENTRAL SEROUS CHORIORETINOPATHY OF RIGHT EYE: ICD-10-CM

## 2023-05-03 PROCEDURE — 3078F DIAST BP <80 MM HG: CPT | Performed by: FAMILY MEDICINE

## 2023-05-03 PROCEDURE — 3074F SYST BP LT 130 MM HG: CPT | Performed by: FAMILY MEDICINE

## 2023-05-03 PROCEDURE — 3008F BODY MASS INDEX DOCD: CPT | Performed by: FAMILY MEDICINE

## 2023-05-03 PROCEDURE — 99213 OFFICE O/P EST LOW 20 MIN: CPT | Performed by: FAMILY MEDICINE

## 2023-05-03 RX ORDER — LATANOPROST 50 UG/ML
1 SOLUTION/ DROPS OPHTHALMIC NIGHTLY
COMMUNITY
Start: 2023-03-25 | End: 2023-05-03

## 2023-05-04 ENCOUNTER — PATIENT MESSAGE (OUTPATIENT)
Dept: FAMILY MEDICINE CLINIC | Facility: CLINIC | Age: 38
End: 2023-05-04

## 2023-05-04 DIAGNOSIS — H35.711 CENTRAL SEROUS CHORIORETINOPATHY OF RIGHT EYE: Primary | ICD-10-CM

## 2023-05-05 NOTE — TELEPHONE ENCOUNTER
From: Keyon Zuleta  To: Sisi Khoury MD  Sent: 5/4/2023 5:52 PM CDT  Subject: Regarding My Central Serous Retinopathy    Hi Doctor  Wanted to reach out if you could order a H. Pylori bacteria test for me. One of my cousins had a similar eye problem as me few years back and has tested positive for H Pylori bacteria. When treated for the same his vision has been restored. Hence wanted to get tested for the same.    Thank you  [facility]

## 2023-05-05 NOTE — TELEPHONE ENCOUNTER
LOV 5/3/23    Future Appointments   Date Time Provider Francisco Gonzalez   5/16/2023  3:15 PM PF MRI RM1 (1.5T) PF MRI [place]     Please advise. Thank you.

## 2023-05-16 ENCOUNTER — HOSPITAL ENCOUNTER (OUTPATIENT)
Dept: MRI IMAGING | Age: 38
Discharge: HOME | End: 2023-05-16
Attending: OPHTHALMOLOGY
Payer: COMMERCIAL

## 2023-05-16 DIAGNOSIS — H53.19 DISTORTED VISION: ICD-10-CM

## 2023-05-16 PROCEDURE — A9575 INJ GADOTERATE MEGLUMI 0.1ML: HCPCS

## 2023-05-16 PROCEDURE — 70543 MRI ORBT/FAC/NCK W/O &W/DYE: CPT | Performed by: OPHTHALMOLOGY

## 2023-05-16 PROCEDURE — 70553 MRI BRAIN STEM W/O & W/DYE: CPT | Performed by: OPHTHALMOLOGY

## 2023-05-16 RX ORDER — GADOTERATE MEGLUMINE 376.9 MG/ML
15 INJECTION INTRAVENOUS
Status: COMPLETED | OUTPATIENT
Start: 2023-05-16 | End: 2023-05-16

## 2023-05-16 RX ADMIN — GADOTERATE MEGLUMINE 15 ML: 376.9 INJECTION INTRAVENOUS at 15:37:00

## 2023-05-17 ENCOUNTER — LAB ENCOUNTER (OUTPATIENT)
Dept: LAB | Age: 38
End: 2023-05-17
Attending: FAMILY MEDICINE
Payer: COMMERCIAL

## 2023-05-17 DIAGNOSIS — H35.711 CENTRAL SEROUS CHORIORETINOPATHY OF RIGHT EYE: ICD-10-CM

## 2023-05-17 PROCEDURE — 83013 H PYLORI (C-13) BREATH: CPT | Performed by: FAMILY MEDICINE

## 2023-05-20 LAB — H PYLORI BREATH TEST: NEGATIVE

## 2023-05-26 ENCOUNTER — PATIENT MESSAGE (OUTPATIENT)
Dept: FAMILY MEDICINE CLINIC | Facility: CLINIC | Age: 38
End: 2023-05-26

## 2023-05-30 NOTE — TELEPHONE ENCOUNTER
From: Kevin Mccord  To: Garrett Romeo MD  Sent: 5/26/2023 11:55 AM CDT  Subject: Cystic Lesion    Do I need to check about cystic lesion or just leave it?

## 2025-02-17 ENCOUNTER — OFFICE VISIT (OUTPATIENT)
Dept: FAMILY MEDICINE CLINIC | Facility: CLINIC | Age: 40
End: 2025-02-17
Payer: COMMERCIAL

## 2025-02-17 ENCOUNTER — NURSE TRIAGE (OUTPATIENT)
Dept: FAMILY MEDICINE CLINIC | Facility: CLINIC | Age: 40
End: 2025-02-17

## 2025-02-17 VITALS
WEIGHT: 148 LBS | HEIGHT: 63.5 IN | SYSTOLIC BLOOD PRESSURE: 130 MMHG | HEART RATE: 86 BPM | DIASTOLIC BLOOD PRESSURE: 84 MMHG | RESPIRATION RATE: 18 BRPM | TEMPERATURE: 98 F | OXYGEN SATURATION: 98 % | BODY MASS INDEX: 25.9 KG/M2

## 2025-02-17 DIAGNOSIS — Z00.00 LABORATORY EXAM ORDERED AS PART OF ROUTINE GENERAL MEDICAL EXAMINATION: ICD-10-CM

## 2025-02-17 DIAGNOSIS — K64.4 INFLAMED EXTERNAL HEMORRHOID: ICD-10-CM

## 2025-02-17 DIAGNOSIS — E61.1 IRON DEFICIENCY: ICD-10-CM

## 2025-02-17 DIAGNOSIS — E55.9 VITAMIN D DEFICIENCY: ICD-10-CM

## 2025-02-17 DIAGNOSIS — R51.9 TEMPORAL HEADACHE: ICD-10-CM

## 2025-02-17 DIAGNOSIS — R19.8 UMBILICAL DISCHARGE: ICD-10-CM

## 2025-02-17 DIAGNOSIS — M54.50 ACUTE MIDLINE LOW BACK PAIN WITHOUT SCIATICA: ICD-10-CM

## 2025-02-17 DIAGNOSIS — E53.8 VITAMIN B12 DEFICIENCY: ICD-10-CM

## 2025-02-17 DIAGNOSIS — L03.316 CELLULITIS, UMBILICAL: Primary | ICD-10-CM

## 2025-02-17 PROBLEM — F32.2 CURRENT SEVERE EPISODE OF MAJOR DEPRESSIVE DISORDER WITHOUT PSYCHOTIC FEATURES WITHOUT PRIOR EPISODE (HCC): Status: RESOLVED | Noted: 2021-05-23 | Resolved: 2025-02-17

## 2025-02-17 RX ORDER — MUPIROCIN 20 MG/G
1 OINTMENT TOPICAL 2 TIMES DAILY
Qty: 15 G | Refills: 0 | Status: SHIPPED | OUTPATIENT
Start: 2025-02-17

## 2025-02-17 RX ORDER — HYDROCORTISONE 25 MG/G
1 CREAM TOPICAL 2 TIMES DAILY
Qty: 28 G | Refills: 0 | Status: SHIPPED | OUTPATIENT
Start: 2025-02-17

## 2025-02-17 RX ORDER — CEPHALEXIN 500 MG/1
500 CAPSULE ORAL 2 TIMES DAILY
Qty: 20 CAPSULE | Refills: 0 | Status: SHIPPED | OUTPATIENT
Start: 2025-02-17

## 2025-02-17 NOTE — TELEPHONE ENCOUNTER
Action Requested: Summary for Provider     []  Critical Lab, Recommendations Needed  [] Need Additional Advice  []   FYI    []   Need Orders  [] Need Medications Sent to Pharmacy  []  Other     SUMMARY: Same day appt scheduled for navel discharge.    Reason for call: Discharge  Onset: 3 days    Pt reports bleeding from navel with bad odor and dark discharge. Pt cleaned the area and wants to be evaluated for this. Denies fever. Same day appt scheduled with first available provider. Pt agreed.    Reason for Disposition   Patient wants to be seen    Protocols used: No Protocol [id number]

## 2025-02-17 NOTE — PROGRESS NOTES
Family Medicine Progress Note  ASSESSMENT AND PLAN:  Bhavya Raj Behata is a 39 year old female who is here for:     Clarissa was seen today for abdomen/flank pain and discharge.    Diagnoses and all orders for this visit:    Cellulitis, umbilical  -     cephALEXin 500 MG Oral Cap; Take 1 capsule (500 mg total) by mouth 2 (two) times daily.  -     mupirocin 2 % External Ointment; Apply 1 Application topically 2 (two) times daily.    Umbilical discharge  -     cephALEXin 500 MG Oral Cap; Take 1 capsule (500 mg total) by mouth 2 (two) times daily.    Iron deficiency  -     Ferritin [E]; Future  -     Iron And Tibc [E]; Future    Vitamin B12 deficiency  -     Vitamin B12 [E]; Future    Vitamin D deficiency  -     Vitamin D; Future    Laboratory exam ordered as part of routine general medical examination  -     Comp Metabolic Panel (14); Future  -     Lipid Panel; Future  -     Assay, Thyroid Stim Hormone; Future  -     CBC With Differential With Platelet; Future    Acute midline low back pain without sciatica  -     Sed Rate, Westergren (Automated); Future  -     C-Reactive Protein; Future    Temporal headache  -     Sed Rate, Westergren (Automated); Future  -     C-Reactive Protein; Future    Inflamed external hemorrhoid  -     hydrocortisone 2.5 % External Cream; Apply 1 Application topically 2 (two) times daily.  -     continue sitz bath  -     avoid straining with BM and sitting for prolonged hours         The patient indicates understanding of these issues and agrees to the plan.  Follow-Up: The patient is asked to return in Return if symptoms worsen or fail to improve.  .     Ela Kennedy MD   02/17/25      CC: Abdomen/Flank Pain and Discharge (naval - odor, dark discharge/)    HPI:   Bhavya Raj Behata is a 39 year old female who presents for Abdomen/Flank Pain and Discharge (naval - odor, dark discharge/)     Patient complaining that she noticed thick foul smelling discharge from her umbilicus yesterday, use a  qtip to clean it, feels some tenderness around her umbilicus.     had sever back pain last week, went to an urgent care near her house and was prescribed cyclobenzaprine, tramadol and ibuprofen, states took it for 3 days, felt a little better and has not taken any medication since then, still has some pain.    Complaining that for the past 6 months wakes up in the morning with pain in the right temple and cheek, feels like burning, pin pricks and at times sensation of something crawling on her face, lasts for several hours and then resolves.    States hemorrhoids inflamed, painful for the past couple of weeks, has been doing sitz bath and otc cream with some relief.    ALLERGY:     Allergies as of 02/17/2025    (No Known Allergies)     MEDICATIONS:     Current Outpatient Medications   Medication Sig Dispense Refill    Cholecalciferol (VITAMIN D3) 25 MCG (1000 UT) Oral Cap Take 1 tablet by mouth daily.      Cyanocobalamin (B-12) 2500 MCG Oral Tab Take by mouth.        Past Medical History:    Allergic rhinitis    Pollen,new spring blossoms    Gestational diabetes (HCC)      Social History:  Social History     Socioeconomic History    Marital status:      Spouse name: Andreas tijerina    Number of children: 2   Occupational History    Occupation:      Comment: [employer]   Tobacco Use    Smoking status: Never    Smokeless tobacco: Never   Vaping Use    Vaping status: Never Used   Substance and Sexual Activity    Alcohol use: No    Drug use: No    Sexual activity: Yes     Partners: Male     Birth control/protection: Tubal Ligation   Other Topics Concern    Caffeine Concern No    Exercise No    Seat Belt No    Special Diet No    Stress Concern No    Weight Concern No     Social Drivers of Health     Food Insecurity: No Food Insecurity (2/17/2025)    NCSS - Food Insecurity     Worried About Running Out of Food in the Last Year: No     Ran Out of Food in the Last Year: No   Transportation  Needs: No Transportation Needs (2/17/2025)    NCSS - Transportation     Lack of Transportation: No   Housing Stability: Not At Risk (2/17/2025)    NCSS - Housing/Utilities     Has Housing: Yes     Worried About Losing Housing: No     Unable to Get Utilities: No        REVIEW OF SYSTEMS:   A comprehensive 10 point review of systems was completed.  Pertinent positives and negatives noted in the the HPI.      EXAM:   /84   Pulse 86   Temp 98 °F (36.7 °C) (Temporal)   Resp 18   Ht 5' 3.5\" (1.613 m)   Wt 148 lb (67.1 kg)   LMP 02/03/2025   SpO2 98%   BMI 25.81 kg/m²   GENERAL: well developed, well nourished,in no apparent distress  HEENT: normocephalic, atraumatic, no tenderness in the temporal area  NECK: supple  LUNGS: clear to auscultation  CARDIO: RRR without murmur  ABDOMEN: mild tenderness in the umbilical area, no discharge  BACK: mild tenderness to palpation over the lower lumbar spine, + spasm of muscle, SLR negative bilateral.  NEURO: no focal deficits      NOTE TO PATIENT: The 21st Century Cures Act makes clinical notes like these available to patients in the interest of transparency. Clinical notes are medical documents used by physicians and care providers to communicate with each other. These documents include medical language and terminology, abbreviations, and treatment information that may sound technical and at times possibly unfamiliar. In addition, at times, the verbiage may appear blunt or direct. These documents are one tool providers use to communicate relevant information and clinical opinions of the care providers in a way that allows common understanding of the clinical context.      Ela Kennedy MD

## 2025-02-17 NOTE — TELEPHONE ENCOUNTER
Patient schedule thru My Chart.  Does this need to be triaged?         Appointment for: Bhavya Raj Behata ([id number])   Visit type: MYCHART EXAM (2964)   2/18/2025 9:00 AM (20 minutes) with Ela Kennedy in [facility] [address] [place] [address]      Patient comments:   Having unexpected bleeding with bad odor sticky fluid from navel button today.

## 2025-03-18 ENCOUNTER — LAB ENCOUNTER (OUTPATIENT)
Dept: LAB | Age: 40
End: 2025-03-18
Attending: FAMILY MEDICINE
Payer: COMMERCIAL

## 2025-03-18 DIAGNOSIS — E53.8 VITAMIN B12 DEFICIENCY: ICD-10-CM

## 2025-03-18 DIAGNOSIS — R51.9 TEMPORAL HEADACHE: ICD-10-CM

## 2025-03-18 DIAGNOSIS — E55.9 VITAMIN D DEFICIENCY: ICD-10-CM

## 2025-03-18 DIAGNOSIS — M54.50 ACUTE MIDLINE LOW BACK PAIN WITHOUT SCIATICA: ICD-10-CM

## 2025-03-18 DIAGNOSIS — E61.1 IRON DEFICIENCY: ICD-10-CM

## 2025-03-18 DIAGNOSIS — Z00.00 LABORATORY EXAM ORDERED AS PART OF ROUTINE GENERAL MEDICAL EXAMINATION: ICD-10-CM

## 2025-03-18 LAB
ALBUMIN SERPL-MCNC: 4.6 G/DL (ref 3.2–4.8)
ALBUMIN/GLOB SERPL: 1.6 {RATIO} (ref 1–2)
ALP LIVER SERPL-CCNC: 51 U/L
ALT SERPL-CCNC: 12 U/L
ANION GAP SERPL CALC-SCNC: 8 MMOL/L (ref 0–18)
AST SERPL-CCNC: 20 U/L (ref ?–34)
BASOPHILS # BLD AUTO: 0.03 X10(3) UL (ref 0–0.2)
BASOPHILS NFR BLD AUTO: 0.6 %
BILIRUB SERPL-MCNC: 0.4 MG/DL (ref 0.3–1.2)
BUN BLD-MCNC: 7 MG/DL (ref 9–23)
CALCIUM BLD-MCNC: 9.2 MG/DL (ref 8.7–10.6)
CHLORIDE SERPL-SCNC: 105 MMOL/L (ref 98–112)
CHOLEST SERPL-MCNC: 162 MG/DL (ref ?–200)
CO2 SERPL-SCNC: 29 MMOL/L (ref 21–32)
CREAT BLD-MCNC: 0.61 MG/DL
CRP SERPL-MCNC: <0.4 MG/DL (ref ?–0.5)
DEPRECATED HBV CORE AB SER IA-ACNC: 28 NG/ML
EGFRCR SERPLBLD CKD-EPI 2021: 117 ML/MIN/1.73M2 (ref 60–?)
EOSINOPHIL # BLD AUTO: 0.14 X10(3) UL (ref 0–0.7)
EOSINOPHIL NFR BLD AUTO: 3 %
ERYTHROCYTE [DISTWIDTH] IN BLOOD BY AUTOMATED COUNT: 12.7 %
ERYTHROCYTE [SEDIMENTATION RATE] IN BLOOD: 11 MM/HR
FASTING PATIENT LIPID ANSWER: YES
FASTING STATUS PATIENT QL REPORTED: YES
GLOBULIN PLAS-MCNC: 2.9 G/DL (ref 2–3.5)
GLUCOSE BLD-MCNC: 86 MG/DL (ref 70–99)
HCT VFR BLD AUTO: 40.3 %
HDLC SERPL-MCNC: 57 MG/DL (ref 40–59)
HGB BLD-MCNC: 13.3 G/DL
IMM GRANULOCYTES # BLD AUTO: 0.01 X10(3) UL (ref 0–1)
IMM GRANULOCYTES NFR BLD: 0.2 %
IRON SATN MFR SERPL: 17 %
IRON SERPL-MCNC: 60 UG/DL
LDLC SERPL CALC-MCNC: 93 MG/DL (ref ?–100)
LYMPHOCYTES # BLD AUTO: 1.47 X10(3) UL (ref 1–4)
LYMPHOCYTES NFR BLD AUTO: 31.3 %
MCH RBC QN AUTO: 29.5 PG (ref 26–34)
MCHC RBC AUTO-ENTMCNC: 33 G/DL (ref 31–37)
MCV RBC AUTO: 89.4 FL
MONOCYTES # BLD AUTO: 0.44 X10(3) UL (ref 0.1–1)
MONOCYTES NFR BLD AUTO: 9.4 %
NEUTROPHILS # BLD AUTO: 2.61 X10 (3) UL (ref 1.5–7.7)
NEUTROPHILS # BLD AUTO: 2.61 X10(3) UL (ref 1.5–7.7)
NEUTROPHILS NFR BLD AUTO: 55.5 %
NONHDLC SERPL-MCNC: 105 MG/DL (ref ?–130)
OSMOLALITY SERPL CALC.SUM OF ELEC: 291 MOSM/KG (ref 275–295)
PLATELET # BLD AUTO: 260 10(3)UL (ref 150–450)
POTASSIUM SERPL-SCNC: 4.3 MMOL/L (ref 3.5–5.1)
PROT SERPL-MCNC: 7.5 G/DL (ref 5.7–8.2)
RBC # BLD AUTO: 4.51 X10(6)UL
SODIUM SERPL-SCNC: 142 MMOL/L (ref 136–145)
TOTAL IRON BINDING CAPACITY: 357 UG/DL (ref 250–425)
TRANSFERRIN SERPL-MCNC: 264 MG/DL (ref 250–380)
TRIGL SERPL-MCNC: 61 MG/DL (ref 30–149)
TSI SER-ACNC: 2.75 UIU/ML (ref 0.55–4.78)
VIT B12 SERPL-MCNC: 387 PG/ML (ref 211–911)
VIT D+METAB SERPL-MCNC: 17.7 NG/ML (ref 30–100)
VLDLC SERPL CALC-MCNC: 10 MG/DL (ref 0–30)
WBC # BLD AUTO: 4.7 X10(3) UL (ref 4–11)

## 2025-03-18 PROCEDURE — 82306 VITAMIN D 25 HYDROXY: CPT | Performed by: FAMILY MEDICINE

## 2025-03-18 PROCEDURE — 83540 ASSAY OF IRON: CPT | Performed by: FAMILY MEDICINE

## 2025-03-18 PROCEDURE — 82607 VITAMIN B-12: CPT | Performed by: FAMILY MEDICINE

## 2025-03-18 PROCEDURE — 80050 GENERAL HEALTH PANEL: CPT | Performed by: FAMILY MEDICINE

## 2025-03-18 PROCEDURE — 86140 C-REACTIVE PROTEIN: CPT | Performed by: FAMILY MEDICINE

## 2025-03-18 PROCEDURE — 82728 ASSAY OF FERRITIN: CPT | Performed by: FAMILY MEDICINE

## 2025-03-18 PROCEDURE — 83550 IRON BINDING TEST: CPT | Performed by: FAMILY MEDICINE

## 2025-03-18 PROCEDURE — 85652 RBC SED RATE AUTOMATED: CPT | Performed by: FAMILY MEDICINE

## 2025-03-18 PROCEDURE — 80061 LIPID PANEL: CPT | Performed by: FAMILY MEDICINE

## 2025-03-25 ENCOUNTER — OFFICE VISIT (OUTPATIENT)
Dept: FAMILY MEDICINE CLINIC | Facility: CLINIC | Age: 40
End: 2025-03-25
Payer: COMMERCIAL

## 2025-03-25 VITALS
DIASTOLIC BLOOD PRESSURE: 80 MMHG | OXYGEN SATURATION: 98 % | TEMPERATURE: 98 F | BODY MASS INDEX: 25.2 KG/M2 | HEIGHT: 63.5 IN | WEIGHT: 144 LBS | RESPIRATION RATE: 18 BRPM | HEART RATE: 84 BPM | SYSTOLIC BLOOD PRESSURE: 118 MMHG

## 2025-03-25 DIAGNOSIS — Z12.4 SCREENING FOR CERVICAL CANCER: ICD-10-CM

## 2025-03-25 DIAGNOSIS — R14.0 POSTPRANDIAL BLOATING: ICD-10-CM

## 2025-03-25 DIAGNOSIS — E53.8 VITAMIN B12 DEFICIENCY: ICD-10-CM

## 2025-03-25 DIAGNOSIS — K64.4 INFLAMED EXTERNAL HEMORRHOID: ICD-10-CM

## 2025-03-25 DIAGNOSIS — E61.1 IRON DEFICIENCY: ICD-10-CM

## 2025-03-25 DIAGNOSIS — E55.9 VITAMIN D DEFICIENCY: ICD-10-CM

## 2025-03-25 DIAGNOSIS — Z00.00 ROUTINE GENERAL MEDICAL EXAMINATION AT A HEALTH CARE FACILITY: Primary | ICD-10-CM

## 2025-03-25 DIAGNOSIS — E04.1 THYROID NODULE: ICD-10-CM

## 2025-03-25 DIAGNOSIS — H93.13 TINNITUS OF BOTH EARS: ICD-10-CM

## 2025-03-25 DIAGNOSIS — R53.83 OTHER FATIGUE: ICD-10-CM

## 2025-03-25 DIAGNOSIS — K21.9 GASTROESOPHAGEAL REFLUX DISEASE, UNSPECIFIED WHETHER ESOPHAGITIS PRESENT: ICD-10-CM

## 2025-03-25 PROCEDURE — 99213 OFFICE O/P EST LOW 20 MIN: CPT | Performed by: FAMILY MEDICINE

## 2025-03-25 PROCEDURE — 3074F SYST BP LT 130 MM HG: CPT | Performed by: FAMILY MEDICINE

## 2025-03-25 PROCEDURE — 3079F DIAST BP 80-89 MM HG: CPT | Performed by: FAMILY MEDICINE

## 2025-03-25 PROCEDURE — 3008F BODY MASS INDEX DOCD: CPT | Performed by: FAMILY MEDICINE

## 2025-03-25 PROCEDURE — 88175 CYTOPATH C/V AUTO FLUID REDO: CPT | Performed by: FAMILY MEDICINE

## 2025-03-25 PROCEDURE — 87624 HPV HI-RISK TYP POOLED RSLT: CPT | Performed by: FAMILY MEDICINE

## 2025-03-25 PROCEDURE — 99395 PREV VISIT EST AGE 18-39: CPT | Performed by: FAMILY MEDICINE

## 2025-03-25 RX ORDER — HYDROCORTISONE 25 MG/G
1 CREAM TOPICAL 2 TIMES DAILY
Qty: 28 G | Refills: 0 | Status: SHIPPED | OUTPATIENT
Start: 2025-03-25

## 2025-03-25 NOTE — PROGRESS NOTES
Family Medicine Progress Note    Bhavya Raj Behata is a 40 year old female.  ASSESSMENT AND PLAN:  Clarissa was seen today for physical and other.    Diagnoses and all orders for this visit:    Routine general medical examination at a health care facility    Screening for cervical cancer  -     ThinPrep PAP Smear; Future  -     Hpv Dna  High Risk , Thin Prep Collect; Future    Thyroid nodule  Right side of thyroid feels firm and enlarged, advised to schedule an US of thyroid for further evaluation  -     US THYROID (CPT=76536); Future    Postprandial bloating  Chronic ongoing symptoms, H.pylori negative, refer to gastro for further evaluation  -     Gastro Referral - In Network    Gastroesophageal reflux disease, unspecified whether esophagitis present  -     Gastro Referral - In Network    Tinnitus of both ears  Worsened recently per patient, hearing is normal, refer to ENT for evaluation  -     ENT Referral - In Network    Vitamin D deficiency        - advised to restart prescription and take it for 3 months, continue over the counter vitamin D after finishing the prescription.  -     Vitamin D [E]; Future    Iron deficiency  Chronic iron deficiency with ferritin at 24 and iron at 60, likely influenced by vegetarian diet. No constipation with supplementation. Discussed dietary iron sources and increased intake importance.  - Increase iron supplementation to twice daily for one month, then reduce to once daily.  - Encourage dietary intake of iron-rich foods such as figs, dates, and green leafy vegetables.  -     Ferritin [E]; Future    Vitamin B12 deficiency  Vitamin B12 level at 387, below desired 400, possibly due to vegetarian diet. Discussed dietary B12 sources and maintaining adequate levels.  - Continue current B12 supplementation.  - Encourage dietary intake of B12-rich foods such as nutritional yeast.    Other fatigue  Fatigue likely due to multiple deficiencies and lack  of sleep. Discussed self-care, adequate sleep, and stress management importance.  - Advise ensuring at least 6 hours of sleep per night and taking time for self-care on weekends.    Inflamed external hemorrhoid  Inflamed external hemorrhoid, previously responsive to hydrocortisone ointment. Current inflammation present.  - Refill hydrocortisone ointment prescription.  - Advise sitz baths to reduce inflammation.  -     hydrocortisone 2.5 % External Cream; Apply 1 Application topically 2 (two) times daily.    Age appropriate anticipatory guidance reviewed  Health Maintenance   Topic Date Due    DTaP,Tdap,and Td Vaccines (1 - Tdap) Never done    Annual Physical  03/14/2024    Pap Smear  03/26/2025    COVID-19 Vaccine (3 - 2024-25 season) 04/25/2025 (Originally 9/1/2024)    Influenza Vaccine (1) 06/30/2025 (Originally 10/1/2024)    Annual Depression Screening  Completed    Pneumococcal Vaccine: Birth to 50yrs  Aged Out    Meningococcal B Vaccine  Aged Out       The patient indicates understanding of these issues and agrees to the plan.  Follow-Up: The patient is asked to Return in about 1 year (around 3/25/2026) for annual.    Ela Kennedy MD      HPI:     History of Present Illness  Bhavya Raj Behata is a 40 year old female who presents for annual physical and pap.  Periods are slightly irregular and heavy for a day.  Does do breast self exam, no family history of breast ca.    Presents with fatigue and dietary concerns.    She experiences significant fatigue, which she attributes to her vitamin D and iron deficiencies. She works long hours, up to 18 hours a day, and feels exhausted by the end of the day. Disturbed sleep is also present, which she believes contributes to her fatigue.    She has a history of vitamin D deficiency, initially identified last year with low blood levels. She took vitamin D supplements for about four to four and a half months,She currently takes over-the-counter vitamin D 2000 IU but not  consistently.    She has a history of iron deficiency, with a ferritin level of 24 and iron level of 60. As a vegetarian, tries to eat a couple of dates daily. She does not experience constipation with iron supplements and currently takes them twice a day.    She experiences bloating and heartburn, especially after consuming certain foods like masala and salt. She has been tested for H. pylori in the past and it was negative.    She has hemorrhoids, which have been inflamed recently due to constant sitting and work. States did not  her prescription for hydrocortisone that was previously sent and would like another prescription.    PAST MEDICAL HISTORY:     Past Medical History:    Allergic rhinitis    Pollen,new spring blossoms    Gestational diabetes (HCC)     PAST SURGICAL HISTORY:     Past Surgical History:   Procedure Laterality Date    Tubal ligation       ALLERGY:     Allergies as of 03/25/2025    (No Known Allergies)       MEDICATIONS:     Current Outpatient Medications   Medication Sig Dispense Refill    ergocalciferol 1.25 MG (13932 UT) Oral Cap Take 1 capsule (50,000 Units total) by mouth once a week. 12 capsule 0    mupirocin 2 % External Ointment Apply 1 Application topically 2 (two) times daily. 15 g 0    hydrocortisone 2.5 % External Cream Apply 1 Application topically 2 (two) times daily. 28 g 0    Cholecalciferol (VITAMIN D3) 25 MCG (1000 UT) Oral Cap Take 1 tablet by mouth daily.      Cyanocobalamin (B-12) 2500 MCG Oral Tab Take by mouth.       FAMILY HISTORY:     Family History   Problem Relation Age of Onset    Diabetes Mother     Hypertension Mother     Diabetes Maternal Grandmother     Hypertension Maternal Grandmother     Stroke Father     Cancer Paternal Aunt 40        uterine     SOCIAL HISTORY:     Social History     Socioeconomic History    Marital status:      Spouse name: Andreas tijerina    Number of children: 2   Occupational History    Occupation:      Comment:   [employer]   Tobacco Use    Smoking status: Never    Smokeless tobacco: Never   Vaping Use    Vaping status: Never Used   Substance and Sexual Activity    Alcohol use: No    Drug use: No    Sexual activity: Yes     Partners: Male     Birth control/protection: Tubal Ligation   Other Topics Concern    Caffeine Concern No    Exercise No    Seat Belt No    Special Diet No    Stress Concern No    Weight Concern No     Social Drivers of Health     Food Insecurity: No Food Insecurity (2/17/2025)    NCSS - Food Insecurity     Worried About Running Out of Food in the Last Year: No     Ran Out of Food in the Last Year: No   Transportation Needs: No Transportation Needs (2/17/2025)    NCSS - Transportation     Lack of Transportation: No   Housing Stability: Not At Risk (2/17/2025)    NCSS - Housing/Utilities     Has Housing: Yes     Worried About Losing Housing: No     Unable to Get Utilities: No     REVIEW OF SYSTEMS:   Review of Systems   Constitutional:  Positive for fatigue. Negative for appetite change, fever and unexpected weight change.   HENT:  Positive for tinnitus. Negative for congestion, ear pain, hearing loss and sore throat.    Eyes:  Negative for discharge, redness and visual disturbance.   Respiratory:  Negative for cough, chest tightness and shortness of breath.    Cardiovascular:  Negative for chest pain and palpitations.   Gastrointestinal:  Negative for abdominal pain, blood in stool, constipation and nausea.        As documented in HPI   Endocrine: Negative for cold intolerance, heat intolerance and polyuria.   Genitourinary:  Negative for difficulty urinating, dysuria, frequency and urgency.   Musculoskeletal:  Negative for arthralgias, gait problem, joint swelling and myalgias.   Skin:  Negative for rash.   Allergic/Immunologic: Negative for food allergies.   Neurological:  Negative for dizziness, weakness, numbness and headaches.   Hematological:  Negative for adenopathy. Does not bruise/bleed  easily.   Psychiatric/Behavioral:  Negative for dysphoric mood and sleep disturbance. The patient is not nervous/anxious.         PHYSICAL EXAM:   /80   Pulse 84   Temp 97.9 °F (36.6 °C) (Temporal)   Resp 18   Ht 5' 3.5\" (1.613 m)   Wt 144 lb (65.3 kg)   LMP 03/05/2025   SpO2 98%   BMI 25.11 kg/m²     Physical Exam  Constitutional:       General: She is not in acute distress.     Appearance: Normal appearance. She is well-developed and normal weight.   HENT:      Head: Normocephalic and atraumatic.      Right Ear: Tympanic membrane, ear canal and external ear normal.      Left Ear: Tympanic membrane, ear canal and external ear normal.      Nose: Nose normal.      Mouth/Throat:      Mouth: Mucous membranes are moist.      Pharynx: Oropharynx is clear.   Eyes:      Extraocular Movements: Extraocular movements intact.      Conjunctiva/sclera: Conjunctivae normal.      Pupils: Pupils are equal, round, and reactive to light.   Neck:      Thyroid: Thyromegaly present.   Cardiovascular:      Rate and Rhythm: Normal rate and regular rhythm.      Pulses: Normal pulses.      Heart sounds: Normal heart sounds. No murmur heard.  Pulmonary:      Effort: Pulmonary effort is normal. No respiratory distress.      Breath sounds: Normal breath sounds.   Chest:      Chest wall: No tenderness.   Breasts:     Right: No swelling (texture dense), inverted nipple, mass, nipple discharge, skin change or tenderness.      Left: No swelling (texture dense), inverted nipple, mass, nipple discharge, skin change or tenderness.   Abdominal:      General: Bowel sounds are normal. There is no distension.      Palpations: Abdomen is soft. There is no mass.      Tenderness: There is no abdominal tenderness.      Hernia: There is no hernia in the left inguinal area or right inguinal area.      Comments: No HSM   Genitourinary:     General: Normal vulva.      Exam position: Lithotomy position.      Labia:         Right: No rash or lesion.          Left: No rash or lesion.       Urethra: No urethral pain or urethral lesion.      Vagina: Normal. No vaginal discharge, erythema or lesions.      Cervix: Normal. No cervical motion tenderness, discharge, lesion, erythema or cervical bleeding.      Uterus: Normal. Not deviated, not enlarged, not fixed and not tender.       Adnexa: Right adnexa normal.        Right: No mass, tenderness or fullness.          Left: No mass, tenderness or fullness.        Rectum: External hemorrhoid (inflamed) present.   Musculoskeletal:         General: Normal range of motion.      Cervical back: Normal range of motion and neck supple.      Right lower leg: No edema.      Left lower leg: No edema.   Lymphadenopathy:      Cervical: No cervical adenopathy.      Upper Body:      Right upper body: No supraclavicular, axillary or pectoral adenopathy.      Left upper body: No supraclavicular, axillary or pectoral adenopathy.      Lower Body: No right inguinal adenopathy. No left inguinal adenopathy.   Skin:     General: Skin is warm.      Findings: No rash.   Neurological:      General: No focal deficit present.      Mental Status: She is alert and oriented to person, place, and time.      Cranial Nerves: No cranial nerve deficit.      Sensory: No sensory deficit.      Motor: No weakness.      Coordination: Coordination normal.      Gait: Gait normal.      Deep Tendon Reflexes: Reflexes are normal and symmetric. Reflexes normal.   Psychiatric:         Mood and Affect: Mood normal.         Behavior: Behavior normal.         Thought Content: Thought content normal.         Judgment: Judgment normal.            LABS AND IMAGING:     Component      Latest Ref [facility] 3/18/2025   WBC      4.0 - 11.0 x10(3) uL 4.7    RBC      3.80 - 5.30 x10(6)uL 4.51    Hemoglobin      12.0 - 16.0 g/dL 13.3    Hematocrit      35.0 - 48.0 % 40.3    Platelet Count      150.0 - 450.0 10(3)uL 260.0    MCV      80.0 - 100.0 fL 89.4    MCH      26.0 - 34.0 pg 29.5     MCHC      31.0 - 37.0 g/dL 33.0    RDW      % 12.7    Prelim Neutrophil Abs      1.50 - 7.70 x10 (3) uL 2.61    Neutrophils Absolute      1.50 - 7.70 x10(3) uL 2.61    Lymphocytes Absolute      1.00 - 4.00 x10(3) uL 1.47    Monocytes Absolute      0.10 - 1.00 x10(3) uL 0.44    Eosinophils Absolute      0.00 - 0.70 x10(3) uL 0.14    Basophils Absolute      0.00 - 0.20 x10(3) uL 0.03    Immature Granulocyte Absolute      0.00 - 1.00 x10(3) uL 0.01    Neutrophils %      % 55.5    Lymphocytes %      % 31.3    Monocytes %      % 9.4    Eosinophils %      % 3.0    Basophils %      % 0.6    Immature Granulocyte %      % 0.2    Glucose      70 - 99 mg/dL 86    Sodium      136 - 145 mmol/L 142    Potassium      3.5 - 5.1 mmol/L 4.3    Chloride      98 - 112 mmol/L 105    Carbon Dioxide, Total      21.0 - 32.0 mmol/L 29.0    ANION GAP      0 - 18 mmol/L 8    BUN      9 - 23 mg/dL 7 (L)    CREATININE      0.55 - 1.02 mg/dL 0.61    CALCIUM      8.7 - 10.6 mg/dL 9.2    CALCULATED OSMOLALITY      275 - 295 mOsm/kg 291    EGFR      >=60 mL/min/1.73m2 117    AST (SGOT)      <34 U/L 20    ALT (SGPT)      10 - 49 U/L 12    ALKALINE PHOSPHATASE      37 - 98 U/L 51    Total Bilirubin      0.3 - 1.2 mg/dL 0.4    PROTEIN, TOTAL      5.7 - 8.2 g/dL 7.5    Albumin      3.2 - 4.8 g/dL 4.6    Globulin      2.0 - 3.5 g/dL 2.9    A/G Ratio      1.0 - 2.0  1.6    Patient Fasting for CMP? Yes    Cholesterol, Total      <200 mg/dL 162    HDL Cholesterol      40 - 59 mg/dL 57    Triglycerides      30 - 149 mg/dL 61    LDL Cholesterol Calc      <100 mg/dL 93    VLDL      0 - 30 mg/dL 10    NON-HDL CHOLESTEROL      <130 mg/dL 105    Patient Fasting for Lipid? Yes    Iron, Serum      50 - 170 ug/dL 60    Transferrin      250 - 380 mg/dL 264    Iron Bind.Cap.(TIBC)      250 - 425 ug/dL 357    Iron Saturation      15 - 50 % 17    SED RATE      0 - 20 mm/Hr 11    FERRITIN      50 - 306 ng/mL 28 (L)    Vitamin B12      211 - 911 pg/mL 387    TSH       0.550 - 4.780 uIU/mL 2.750    C-REACTIVE PROTEIN      <=0.50 mg/dL <0.40    VITAMIN D, 25-OH, TOTAL      30.0 - 100.0 ng/mL 17.7 (L)       Legend:  (L) Low       The 21st Century Cures Act makes medical notes like these available to patients in the interest of transparency. Please be advised this is a medical document. Medical documents are intended to carry relevant information, facts as evident, and the clinical opinion of the practitioner. The medical note is intended as peer to peer communication and may appear blunt or direct. It is written in medical language and may contain abbreviations or verbiage that are unfamiliar.     Ela Kennedy MD

## 2025-03-26 LAB — HPV E6+E7 MRNA CVX QL NAA+PROBE: NEGATIVE

## 2025-03-29 ENCOUNTER — HOSPITAL ENCOUNTER (OUTPATIENT)
Dept: ULTRASOUND IMAGING | Age: 40
Discharge: HOME | End: 2025-03-29
Attending: FAMILY MEDICINE
Payer: COMMERCIAL

## 2025-03-29 DIAGNOSIS — E04.1 THYROID NODULE: ICD-10-CM

## 2025-03-29 PROCEDURE — 76536 US EXAM OF HEAD AND NECK: CPT | Performed by: FAMILY MEDICINE

## 2025-04-01 ENCOUNTER — PATIENT MESSAGE (OUTPATIENT)
Dept: FAMILY MEDICINE CLINIC | Facility: CLINIC | Age: 40
End: 2025-04-01

## 2025-04-01 NOTE — TELEPHONE ENCOUNTER
I spoke with patient and she stated that she has had left ear pain, jaw and throat pain, Denies chest pain, denies numbness or tingling in her left arm, denies shortness of breath, patient states she does not have a fever. Patient states that she had ultrasound of her thyroid on 3/29 and that she has had ongoing throat and jaw pain. Advised patient that she can take over the counter ibuprofen and tylenol for pain, drink fluids, can use warm compress on left ear and jaw and rest.      I advised patient that if her symptoms are acute to go to an urgent care of walk in clinic to be evaluated. Advised patient if she has any chest pain or shortness of breath to go to the ED or call 911.    Advised patient that Dr Kennedy is out of the office this week. Patient has an appointment with ENT on 5/5/25. Patient verbalized understanding of instructions given.

## 2025-05-22 ENCOUNTER — MED REC SCAN ONLY (OUTPATIENT)
Dept: FAMILY MEDICINE CLINIC | Facility: CLINIC | Age: 40
End: 2025-05-22

## (undated) NOTE — LETTER
09/24/19        Nasrin Olivares  [address] [id number]      Dear Laine Ruiz,    [facility] records indicate that you have outstanding lab work and or testing that was ordered for you and has not yet been completed:  Orders Placed This Encounter

## (undated) NOTE — Clinical Note
I had the pleasure of seeing [place] on 8/7/2019. Please see my attached note. Klaudia Molina MD [facility]AC[facility]--Surgery